# Patient Record
Sex: MALE | Race: WHITE
[De-identification: names, ages, dates, MRNs, and addresses within clinical notes are randomized per-mention and may not be internally consistent; named-entity substitution may affect disease eponyms.]

---

## 2018-07-30 ENCOUNTER — HOSPITAL ENCOUNTER (EMERGENCY)
Dept: HOSPITAL 74 - JER | Age: 83
Discharge: HOME | End: 2018-07-30
Payer: COMMERCIAL

## 2018-07-30 VITALS — SYSTOLIC BLOOD PRESSURE: 130 MMHG | HEART RATE: 66 BPM | DIASTOLIC BLOOD PRESSURE: 78 MMHG

## 2018-07-30 VITALS — BODY MASS INDEX: 22.2 KG/M2

## 2018-07-30 VITALS — TEMPERATURE: 97.4 F

## 2018-07-30 DIAGNOSIS — Z85.528: ICD-10-CM

## 2018-07-30 DIAGNOSIS — Z04.1: Primary | ICD-10-CM
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
## 2018-07-30 DIAGNOSIS — I10: ICD-10-CM

## 2018-07-30 DIAGNOSIS — Z90.5: ICD-10-CM
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
## 2018-07-30 DIAGNOSIS — E78.00: ICD-10-CM
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
## 2018-07-30 NOTE — EKG
Test Reason : 

Blood Pressure : ***/*** mmHG

Vent. Rate : 052 BPM     Atrial Rate : 052 BPM

   P-R Int : 172 ms          QRS Dur : 084 ms

    QT Int : 414 ms       P-R-T Axes : 068 -32 016 degrees

   QTc Int : 385 ms

 

SINUS BRADYCARDIA

LEFT AXIS DEVIATION

ABNORMAL ECG

WHEN COMPARED WITH ECG OF 06-AUG-2016 15:11,

NO SIGNIFICANT CHANGE WAS FOUND

Confirmed by FRANCISCO CASTRO MD ([id number]) on 7/30/2018 4:04:48 PM

 

Referred By:             Confirmed By:FRANCISCO CASTRO MD
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
